# Patient Record
Sex: FEMALE | Race: WHITE | NOT HISPANIC OR LATINO | Employment: OTHER | URBAN - METROPOLITAN AREA
[De-identification: names, ages, dates, MRNs, and addresses within clinical notes are randomized per-mention and may not be internally consistent; named-entity substitution may affect disease eponyms.]

---

## 2019-06-13 ENCOUNTER — HOSPITAL ENCOUNTER (OUTPATIENT)
Dept: OTHER | Facility: HOSPITAL | Age: 84
Discharge: HOME | End: 2019-06-13

## 2019-06-14 ENCOUNTER — HOSPITAL ENCOUNTER (OUTPATIENT)
Dept: OTHER | Facility: HOSPITAL | Age: 84
Discharge: HOME | End: 2019-06-14

## 2019-06-14 LAB
APPEARANCE UR: CLEAR
BILIRUB UR QL: NEGATIVE
COLOR UR: NORMAL
CONV COLLECTION SOURCE (UA): NORMAL
CONV UROBILINOGEN IN URINE BY AUTOMATED TEST STRIP: 0.2 {EHRLICHU}/DL (ref 0.1–1)
GLUCOSE UR QL: NEGATIVE MG/DL
HGB UR QL STRIP: NEGATIVE
KETONES UR QL STRIP: NEGATIVE MG/DL
LEUKOCYTE ESTERASE UR QL STRIP: NEGATIVE
NITRITE UR QL STRIP: NEGATIVE
PH UR STRIP.AUTO: 5.5 [PH] (ref 5–8)
PROT UR QL: NEGATIVE MG/DL
SP GR UR: 1.02 (ref 1–1.03)

## 2019-12-19 ENCOUNTER — HOSPITAL ENCOUNTER (OUTPATIENT)
Dept: OTHER | Facility: HOSPITAL | Age: 84
Discharge: HOME | End: 2019-12-19

## 2022-11-17 ENCOUNTER — LAB REQUISITION (OUTPATIENT)
Dept: LAB | Facility: HOSPITAL | Age: 87
End: 2022-11-17

## 2022-11-17 DIAGNOSIS — N39.0 URINARY TRACT INFECTION, SITE NOT SPECIFIED: ICD-10-CM

## 2022-11-17 LAB
BACTERIA UR QL AUTO: ABNORMAL /HPF
BILIRUB UR QL STRIP: NEGATIVE
CLARITY UR: ABNORMAL
COLOR UR: YELLOW
GLUCOSE UR STRIP-MCNC: NEGATIVE MG/DL
HGB UR QL STRIP.AUTO: NEGATIVE
KETONES UR QL STRIP: NEGATIVE
LEUKOCYTE ESTERASE UR QL STRIP.AUTO: NEGATIVE
NITRITE UR QL STRIP: NEGATIVE
PH UR STRIP.AUTO: 6.5 [PH] (ref 5–8)
PROT UR QL STRIP: NEGATIVE
RBC # UR STRIP: ABNORMAL /HPF
REF LAB TEST METHOD: ABNORMAL
SP GR UR STRIP: 1.02 (ref 1–1.03)
SQUAMOUS #/AREA URNS HPF: ABNORMAL /HPF
UROBILINOGEN UR QL STRIP: ABNORMAL
WBC # UR STRIP: ABNORMAL /HPF

## 2022-11-17 PROCEDURE — 87186 SC STD MICRODIL/AGAR DIL: CPT | Performed by: INTERNAL MEDICINE

## 2022-11-17 PROCEDURE — 87086 URINE CULTURE/COLONY COUNT: CPT | Performed by: INTERNAL MEDICINE

## 2022-11-17 PROCEDURE — 81001 URINALYSIS AUTO W/SCOPE: CPT | Performed by: INTERNAL MEDICINE

## 2022-11-19 LAB — BACTERIA SPEC AEROBE CULT: ABNORMAL

## 2022-12-21 ENCOUNTER — LAB REQUISITION (OUTPATIENT)
Dept: LAB | Facility: HOSPITAL | Age: 87
End: 2022-12-21

## 2022-12-21 DIAGNOSIS — N39.0 URINARY TRACT INFECTION, SITE NOT SPECIFIED: ICD-10-CM

## 2022-12-21 DIAGNOSIS — I10 ESSENTIAL (PRIMARY) HYPERTENSION: ICD-10-CM

## 2022-12-21 LAB
BILIRUB UR QL STRIP: NEGATIVE
CLARITY UR: ABNORMAL
COLOR UR: YELLOW
GLUCOSE UR STRIP-MCNC: NEGATIVE MG/DL
HGB UR QL STRIP.AUTO: NEGATIVE
KETONES UR QL STRIP: NEGATIVE
LEUKOCYTE ESTERASE UR QL STRIP.AUTO: NEGATIVE
NITRITE UR QL STRIP: NEGATIVE
PH UR STRIP.AUTO: 7 [PH] (ref 5–8)
PROT UR QL STRIP: NEGATIVE
SP GR UR STRIP: 1.02 (ref 1–1.03)
UROBILINOGEN UR QL STRIP: ABNORMAL

## 2022-12-21 PROCEDURE — 81003 URINALYSIS AUTO W/O SCOPE: CPT | Performed by: INTERNAL MEDICINE

## 2022-12-21 PROCEDURE — 87086 URINE CULTURE/COLONY COUNT: CPT | Performed by: INTERNAL MEDICINE

## 2022-12-23 LAB — BACTERIA SPEC AEROBE CULT: NORMAL

## 2023-01-01 ENCOUNTER — TELEPHONE (OUTPATIENT)
Dept: FAMILY MEDICINE CLINIC | Age: 88
End: 2023-01-01

## 2023-02-13 ENCOUNTER — LAB (OUTPATIENT)
Dept: LAB | Facility: HOSPITAL | Age: 88
End: 2023-02-13
Payer: MEDICARE

## 2023-02-13 ENCOUNTER — OFFICE VISIT (OUTPATIENT)
Dept: FAMILY MEDICINE CLINIC | Age: 88
End: 2023-02-13
Payer: MEDICARE

## 2023-02-13 VITALS
TEMPERATURE: 97.9 F | OXYGEN SATURATION: 92 % | SYSTOLIC BLOOD PRESSURE: 99 MMHG | HEIGHT: 64 IN | HEART RATE: 108 BPM | WEIGHT: 155 LBS | BODY MASS INDEX: 26.46 KG/M2 | DIASTOLIC BLOOD PRESSURE: 69 MMHG

## 2023-02-13 DIAGNOSIS — N39.0 RECURRENT UTI: ICD-10-CM

## 2023-02-13 DIAGNOSIS — E03.9 ACQUIRED HYPOTHYROIDISM: ICD-10-CM

## 2023-02-13 DIAGNOSIS — Z79.899 HIGH RISK MEDICATION USE: ICD-10-CM

## 2023-02-13 DIAGNOSIS — G25.81 RESTLESS LEG SYNDROME: ICD-10-CM

## 2023-02-13 DIAGNOSIS — R00.0 TACHYCARDIA: Primary | ICD-10-CM

## 2023-02-13 DIAGNOSIS — M47.816 LUMBAR SPONDYLOSIS: ICD-10-CM

## 2023-02-13 PROBLEM — M46.1 INFLAMMATION OF SACROILIAC JOINT: Status: ACTIVE | Noted: 2023-02-13

## 2023-02-13 PROBLEM — K21.9 GERD (GASTROESOPHAGEAL REFLUX DISEASE): Status: ACTIVE | Noted: 2023-02-13

## 2023-02-13 PROBLEM — S42.293P: Status: ACTIVE | Noted: 2023-01-04

## 2023-02-13 LAB
ALBUMIN SERPL-MCNC: 4.1 G/DL (ref 3.5–5.2)
ALBUMIN/GLOB SERPL: 1.2 G/DL
ALP SERPL-CCNC: 142 U/L (ref 39–117)
ALT SERPL W P-5'-P-CCNC: 8 U/L (ref 1–33)
AMPHET+METHAMPHET UR QL: NEGATIVE
AMPHETAMINES UR QL: NEGATIVE
ANION GAP SERPL CALCULATED.3IONS-SCNC: 7.3 MMOL/L (ref 5–15)
AST SERPL-CCNC: 25 U/L (ref 1–32)
BARBITURATES UR QL SCN: NEGATIVE
BENZODIAZ UR QL SCN: NEGATIVE
BILIRUB BLD-MCNC: NEGATIVE MG/DL
BILIRUB SERPL-MCNC: 0.3 MG/DL (ref 0–1.2)
BUN SERPL-MCNC: 23 MG/DL (ref 8–23)
BUN/CREAT SERPL: 22.1 (ref 7–25)
BUPRENORPHINE SERPL-MCNC: NEGATIVE NG/ML
CALCIUM SPEC-SCNC: 9.8 MG/DL (ref 8.2–9.6)
CANNABINOIDS SERPL QL: NEGATIVE
CHLORIDE SERPL-SCNC: 100 MMOL/L (ref 98–107)
CLARITY, POC: ABNORMAL
CO2 SERPL-SCNC: 32.7 MMOL/L (ref 22–29)
COCAINE UR QL: NEGATIVE
COLOR UR: ABNORMAL
CREAT SERPL-MCNC: 1.04 MG/DL (ref 0.57–1)
EGFRCR SERPLBLD CKD-EPI 2021: 51.2 ML/MIN/1.73
EXPIRATION DATE: ABNORMAL
EXPIRATION DATE: NORMAL
GLOBULIN UR ELPH-MCNC: 3.4 GM/DL
GLUCOSE SERPL-MCNC: 106 MG/DL (ref 65–99)
GLUCOSE UR STRIP-MCNC: NEGATIVE MG/DL
KETONES UR QL: ABNORMAL
LEUKOCYTE EST, POC: ABNORMAL
Lab: ABNORMAL
Lab: NORMAL
MDMA UR QL SCN: NEGATIVE
METHADONE UR QL SCN: NEGATIVE
NITRITE UR-MCNC: NEGATIVE MG/ML
OPIATES UR QL: NEGATIVE
OXYCODONE UR QL SCN: NEGATIVE
PCP UR QL SCN: NEGATIVE
PH UR: 6 [PH] (ref 5–8)
POTASSIUM SERPL-SCNC: 4.2 MMOL/L (ref 3.5–5.2)
PROT SERPL-MCNC: 7.5 G/DL (ref 6–8.5)
PROT UR STRIP-MCNC: ABNORMAL MG/DL
RBC # UR STRIP: NEGATIVE /UL
SODIUM SERPL-SCNC: 140 MMOL/L (ref 136–145)
SP GR UR: 1.02 (ref 1–1.03)
TSH SERPL DL<=0.05 MIU/L-ACNC: 1.71 UIU/ML (ref 0.27–4.2)
UROBILINOGEN UR QL: ABNORMAL

## 2023-02-13 PROCEDURE — 84443 ASSAY THYROID STIM HORMONE: CPT

## 2023-02-13 PROCEDURE — 36415 COLL VENOUS BLD VENIPUNCTURE: CPT

## 2023-02-13 PROCEDURE — 87086 URINE CULTURE/COLONY COUNT: CPT | Performed by: NURSE PRACTITIONER

## 2023-02-13 PROCEDURE — 87186 SC STD MICRODIL/AGAR DIL: CPT | Performed by: NURSE PRACTITIONER

## 2023-02-13 PROCEDURE — 87077 CULTURE AEROBIC IDENTIFY: CPT | Performed by: NURSE PRACTITIONER

## 2023-02-13 PROCEDURE — 80305 DRUG TEST PRSMV DIR OPT OBS: CPT | Performed by: NURSE PRACTITIONER

## 2023-02-13 PROCEDURE — 80053 COMPREHEN METABOLIC PANEL: CPT

## 2023-02-13 PROCEDURE — 99204 OFFICE O/P NEW MOD 45 MIN: CPT | Performed by: NURSE PRACTITIONER

## 2023-02-13 RX ORDER — METOPROLOL SUCCINATE 25 MG/1
25 TABLET, EXTENDED RELEASE ORAL DAILY
Qty: 90 TABLET | Refills: 0 | Status: SHIPPED | OUTPATIENT
Start: 2023-02-13 | End: 2023-03-08

## 2023-02-13 RX ORDER — MELOXICAM 7.5 MG/1
7.5 TABLET ORAL AS NEEDED
COMMUNITY
End: 2023-02-13

## 2023-02-13 RX ORDER — GABAPENTIN 300 MG/1
300 CAPSULE ORAL 3 TIMES DAILY
Qty: 270 CAPSULE | Refills: 0 | Status: SHIPPED | OUTPATIENT
Start: 2023-02-13

## 2023-02-13 RX ORDER — TRAMADOL HYDROCHLORIDE 50 MG/1
50 TABLET ORAL AS NEEDED
COMMUNITY
Start: 2022-11-16 | End: 2023-03-08

## 2023-02-13 RX ORDER — GABAPENTIN 300 MG/1
300 CAPSULE ORAL 3 TIMES DAILY
COMMUNITY
Start: 2022-10-31 | End: 2023-02-13 | Stop reason: SDUPTHER

## 2023-02-13 RX ORDER — CRANBERRY FRUIT EXTRACT 250 MG
250 CAPSULE ORAL DAILY
COMMUNITY

## 2023-02-13 RX ORDER — LEVOTHYROXINE SODIUM 0.03 MG/1
12.5 TABLET ORAL EVERY MORNING
COMMUNITY
Start: 2022-12-08 | End: 2023-02-13 | Stop reason: SDUPTHER

## 2023-02-13 RX ORDER — CYCLOBENZAPRINE HCL 10 MG
5-10 TABLET ORAL NIGHTLY
COMMUNITY
Start: 2023-02-01

## 2023-02-13 RX ORDER — CHLORHEXIDINE GLUCONATE 4 %
LIQUID (ML) TOPICAL NIGHTLY
COMMUNITY
End: 2023-03-08

## 2023-02-13 RX ORDER — LEVOTHYROXINE SODIUM 0.03 MG/1
12.5 TABLET ORAL DAILY
Qty: 45 TABLET | Refills: 1 | Status: SHIPPED | OUTPATIENT
Start: 2023-02-13

## 2023-02-13 RX ORDER — DULOXETIN HYDROCHLORIDE 20 MG/1
1 CAPSULE, DELAYED RELEASE ORAL DAILY
COMMUNITY
Start: 2023-01-27

## 2023-02-13 RX ORDER — LATANOPROST 50 UG/ML
SOLUTION/ DROPS OPHTHALMIC 2 TIMES DAILY
COMMUNITY
Start: 2023-01-27

## 2023-02-13 RX ORDER — METOPROLOL SUCCINATE 25 MG/1
25 TABLET, EXTENDED RELEASE ORAL DAILY
COMMUNITY
End: 2023-02-13 | Stop reason: SDUPTHER

## 2023-02-13 RX ORDER — ANTIARTHRITIC COMBINATION NO.2 900 MG
TABLET ORAL DAILY
COMMUNITY

## 2023-02-13 RX ORDER — HYDROCODONE BITARTRATE AND ACETAMINOPHEN 5; 325 MG/1; MG/1
TABLET ORAL AS NEEDED
COMMUNITY
Start: 2022-11-05 | End: 2023-03-08

## 2023-02-13 RX ORDER — DORZOLAMIDE HCL 20 MG/ML
SOLUTION/ DROPS OPHTHALMIC
COMMUNITY
Start: 2022-08-24

## 2023-02-13 RX ORDER — UBIDECARENONE 75 MG
50 CAPSULE ORAL DAILY
COMMUNITY
End: 2023-03-08

## 2023-02-13 NOTE — PROGRESS NOTES
"Chief Complaint  Rae Smith presents to [facility] for Establish Care    Subjective          History of Present Illness    Rae is here today to establish care. She is a former patient of Dr Alicea who retired.   She is also seeing ortho Dr Rob after humerus fracture 11/2022. She has [facility] coming to do physical therapy.   She sees [facility] pain management in [facility] for lumbar spondylosis. She is prescribed Hydrocodone. She was given tramadol at the hospital. She reports that she mostly takes acetaminophen now.   She is on Toprol XL for elevated heart rate. Saw cardiologist 'a long time ago'. She was on 50 mg daily and this was reduced to 25 mg daily..   She was diagnosed with hypothyroidism by Dr Alicea. She takes levothyroxine 12.5 mcg daily. Last TSH was normal in 2020 on records that she has with her today.  She reports that she is on sinemet and gabapentin for restless leg syndrome. She has been stable on these medications for 'several years'.  She reports that she was put on duloxetine because her 'flesh was hurting'.   She is no longer taking pravastatin for hyperlipidemia. Reports that she was advised to stop as her cholesterol was 'perfect'.   She reports that she gets frequent UTIs. No current urine symptoms.  She declines bone density scan.   She sees Dr Mitchell optometry. On eye drops for glaucoma.   She sees Vilma Art for dermatology. History of melanoma.     Review of Systems       Allergies   Allergen Reactions   • Codeine Nausea Only     insomnia  insomnia     • Morphine Nausea And Vomiting     \"it makes me deathly sick\"  \"it makes me deathly sick\"        Past Medical History:   Diagnosis Date   • Cataract    • GERD (gastroesophageal reflux disease)    • Low back pain    • Osteopenia    • Pneumonia    • Urinary tract infection    • Visual impairment      Current Outpatient Medications   Medication Sig Dispense Refill   • ACETAMINOPHEN PO Take 500 mg by " mouth As Needed.     • Biotin 5000 MCG tablet Take  by mouth Daily.     • carbidopa-levodopa (SINEMET)  MG per tablet Take 1 tablet by mouth 3 (Three) Times a Day.     • Cholecalciferol (VITAMIN D3 PO) Take 50 mcg by mouth Daily.     • Cranberry 250 MG capsule Take 250 mg by mouth Daily.     • cyclobenzaprine (FLEXERIL) 10 MG tablet Take 5-10 mg by mouth Every Night.     • Diclofenac Sodium (VOLTAREN) 1 % gel gel As Needed.     • dorzolamide (TRUSOPT) 2 % ophthalmic solution dorzolamide 2 % eye drops   INSTILL 1 DROP EACH EYE EVERY 12 HOURS     • DULoxetine (CYMBALTA) 20 MG capsule Take 1 capsule by mouth Daily.     • gabapentin (NEURONTIN) 300 MG capsule Take 1 capsule by mouth 3 (Three) Times a Day. 270 capsule 0   • HYDROcodone-acetaminophen (NORCO) 5-325 MG per tablet As Needed.     • latanoprost (XALATAN) 0.005 % ophthalmic solution 2 (Two) Times a Day.     • levothyroxine (SYNTHROID, LEVOTHROID) 25 MCG tablet Take 0.5 tablets by mouth Daily. 45 tablet 1   • Melatonin 12 MG tablet Take  by mouth Every Night.     • metoprolol succinate XL (TOPROL-XL) 25 MG 24 hr tablet Take 1 tablet by mouth Daily. 1/2 Tablet 90 tablet 0   • traMADol (ULTRAM) 50 MG tablet Take 50 mg by mouth As Needed.     • vitamin B-12 (CYANOCOBALAMIN) 100 MCG tablet Take 50 mcg by mouth Daily.       No current facility-administered medications for this visit.     Past Surgical History:   Procedure Laterality Date   • CHOLECYSTECTOMY     • COLONOSCOPY     • EYE SURGERY     • FRACTURE SURGERY     • HYSTERECTOMY     • JOINT REPLACEMENT        Social History     Tobacco Use   • Smoking status: Never     Passive exposure: Never   • Smokeless tobacco: Never   Vaping Use   • Vaping Use: Never used   Substance Use Topics   • Alcohol use: Never   • Drug use: Never     Family History   Problem Relation Age of Onset   • Diabetes Daughter    • Diabetes Son    • Cancer Son      Health Maintenance Due   Topic Date Due   • ANNUAL WELLNESS VISIT  Never  "done      Immunization History   Administered Date(s) Administered   • COVID-19 (MODERNA) BOOSTER 10/11/2022   • COVID-19 (PFIZER) PURPLE CAP 02/04/2021, 02/27/2021, 08/17/2021   • Fluad Quad 65+ 10/11/2022   • Fluzone High-Dose 65+yrs 10/14/2021   • Hep B, Unspecified 01/29/1996   • Hepatitis B 07/02/1996   • Shingrix 11/04/2021        Objective     Vitals:    02/13/23 1056   BP: 99/69   BP Location: Right arm   Patient Position: Sitting   Pulse: 108   Temp: 97.9 °F (36.6 °C)   TempSrc: Oral   SpO2: 92%   Weight: 70.3 kg (155 lb)   Height: 162.6 cm (64\")     Body mass index is 26.61 kg/m².     Physical Exam  Vitals reviewed.   Constitutional:       General: She is not in acute distress.     Appearance: Normal appearance. She is well-developed.   HENT:      Head: Normocephalic and atraumatic.   Cardiovascular:      Rate and Rhythm: Normal rate and regular rhythm.   Pulmonary:      Effort: Pulmonary effort is normal.      Breath sounds: Normal breath sounds.   Musculoskeletal:      Comments: In wheelchair   Neurological:      Mental Status: She is alert and oriented to person, place, and time.   Psychiatric:         Mood and Affect: Mood and affect normal.           Result Review :                               Assessment and Plan      Diagnoses and all orders for this visit:    1. Tachycardia (Primary)  Comments:  Medical condition is stable.  Continue same therapy.  Will recheck at next regular appointment  Orders:  -     metoprolol succinate XL (TOPROL-XL) 25 MG 24 hr tablet; Take 1 tablet by mouth Daily. 1/2 Tablet  Dispense: 90 tablet; Refill: 0    2. Acquired hypothyroidism  Comments:  Medical condition is stable.  Continue same therapy. Rechecking lab and will adjust medication as needed  Orders:  -     levothyroxine (SYNTHROID, LEVOTHROID) 25 MCG tablet; Take 0.5 tablets by mouth Daily.  Dispense: 45 tablet; Refill: 1  -     TSH; Future  -     Comprehensive metabolic panel; Future    3. Recurrent " UTI  Comments:  Requesting to have urine checked. Asymptomatic.   Orders:  -     POCT urinalysis dipstick, automated  -     Urine Culture - Urine, Urine, Clean Catch; Future  -     Urine Culture - Urine, Urine, Clean Catch    4. Lumbar spondylosis  Comments:  Continue f/u with pain management as per their recommendations    5. Restless leg syndrome  Comments:  Medical condition is stable.  Continue same therapy.  Will recheck at next regular appointment  Orders:  -     gabapentin (NEURONTIN) 300 MG capsule; Take 1 capsule by mouth 3 (Three) Times a Day.  Dispense: 270 capsule; Refill: 0    6. High risk medication use  -     POC Urine Drug Screen [facility]      Controlled substance documentation: VERONICA reviewed; drug screen performed and appropriate; consent is reviewed and signed and on the chart.  Is aware of risk of addiction on this medication, understands will need to follow up for a review at least every 3 months and medications will be adjusted or decreased as deemed appropriate at each visit.  No history of drug or alcohol abuse.  No concerns about diversion or abuse. Denies side effects related to the medication.  Aware may be called in for pill counts.  The dosing of this medication will be reviewed on a regular basis and reduced if possible.             Follow Up     Return in about 3 months (around 5/13/2023) for Recheck.

## 2023-02-16 PROBLEM — G25.81 RESTLESS LEG SYNDROME: Status: ACTIVE | Noted: 2023-02-16

## 2023-02-16 LAB — BACTERIA SPEC AEROBE CULT: ABNORMAL

## 2023-02-22 ENCOUNTER — OFFICE VISIT (OUTPATIENT)
Dept: FAMILY MEDICINE CLINIC | Age: 88
End: 2023-02-22
Payer: MEDICARE

## 2023-02-22 VITALS
SYSTOLIC BLOOD PRESSURE: 107 MMHG | TEMPERATURE: 97.3 F | HEART RATE: 99 BPM | OXYGEN SATURATION: 99 % | BODY MASS INDEX: 26.29 KG/M2 | DIASTOLIC BLOOD PRESSURE: 70 MMHG | HEIGHT: 64 IN | WEIGHT: 154 LBS

## 2023-02-22 DIAGNOSIS — N39.0 URINARY TRACT INFECTION WITHOUT HEMATURIA, SITE UNSPECIFIED: Primary | ICD-10-CM

## 2023-02-22 DIAGNOSIS — R42 DIZZINESS: ICD-10-CM

## 2023-02-22 PROCEDURE — 99214 OFFICE O/P EST MOD 30 MIN: CPT | Performed by: NURSE PRACTITIONER

## 2023-02-22 RX ORDER — MECLIZINE HCL 12.5 MG/1
12.5 TABLET ORAL 2 TIMES DAILY PRN
Qty: 30 TABLET | Refills: 0 | Status: SHIPPED | OUTPATIENT
Start: 2023-02-22 | End: 2023-03-03

## 2023-02-22 RX ORDER — NITROFURANTOIN 25; 75 MG/1; MG/1
100 CAPSULE ORAL 2 TIMES DAILY
Qty: 14 CAPSULE | Refills: 0 | Status: SHIPPED | OUTPATIENT
Start: 2023-02-22 | End: 2023-03-01

## 2023-02-22 NOTE — PROGRESS NOTES
"Chief Complaint  Rae Smith presents to [facility] for Dizziness (Dizziness X 1 week )    Subjective          History of Present Illness    Rae is here today with c/o dizziness. She reports that she has been having these dizzy episodes intermittently for \"awhile\". They thought that it might be related to her metoprolol originally but her dose was decreased. She notes dizziness when going from sitting to standing position. Improvement with sitting. She denies ear symptoms. She had recent lab work indicating some dehydration and there was bacteria (E.coli) in urine. Had not reported any symptoms at that time so was not treated. Notes improvement in gait with using walker.     Review of Systems      Allergies   Allergen Reactions   • Codeine Nausea Only     insomnia  insomnia     • Morphine Nausea And Vomiting     \"it makes me deathly sick\"  \"it makes me deathly sick\"        Past Medical History:   Diagnosis Date   • Cataract    • GERD (gastroesophageal reflux disease)    • Glaucoma    • Low back pain    • Osteopenia    • Pneumonia    • Urinary tract infection    • Visual impairment      Current Outpatient Medications   Medication Sig Dispense Refill   • ACETAMINOPHEN PO Take 500 mg by mouth As Needed.     • Biotin 5000 MCG tablet Take  by mouth Daily.     • carbidopa-levodopa (SINEMET)  MG per tablet Take 1 tablet by mouth 3 (Three) Times a Day.     • Cholecalciferol (VITAMIN D3 PO) Take 50 mcg by mouth Daily.     • Cranberry 250 MG capsule Take 250 mg by mouth Daily.     • cyclobenzaprine (FLEXERIL) 10 MG tablet Take 5-10 mg by mouth Every Night.     • Diclofenac Sodium (VOLTAREN) 1 % gel gel As Needed.     • dorzolamide (TRUSOPT) 2 % ophthalmic solution dorzolamide 2 % eye drops   INSTILL 1 DROP EACH EYE EVERY 12 HOURS     • DULoxetine (CYMBALTA) 20 MG capsule Take 1 capsule by mouth Daily.     • gabapentin (NEURONTIN) 300 MG capsule Take 1 capsule by mouth 3 (Three) " Times a Day. 270 capsule 0   • HYDROcodone-acetaminophen (NORCO) 5-325 MG per tablet As Needed.     • latanoprost (XALATAN) 0.005 % ophthalmic solution 2 (Two) Times a Day.     • levothyroxine (SYNTHROID, LEVOTHROID) 25 MCG tablet Take 0.5 tablets by mouth Daily. 45 tablet 1   • Melatonin 12 MG tablet Take  by mouth Every Night.     • metoprolol succinate XL (TOPROL-XL) 25 MG 24 hr tablet Take 1 tablet by mouth Daily. 1/2 Tablet (Patient taking differently: Take 25 mg by mouth Daily.) 90 tablet 0   • traMADol (ULTRAM) 50 MG tablet Take 50 mg by mouth As Needed.     • vitamin B-12 (CYANOCOBALAMIN) 100 MCG tablet Take 50 mcg by mouth Daily.     • meclizine (ANTIVERT) 12.5 MG tablet Take 1 tablet by mouth 2 (Two) Times a Day As Needed for Dizziness. 30 tablet 0   • nitrofurantoin, macrocrystal-monohydrate, (Macrobid) 100 MG capsule Take 1 capsule by mouth 2 (Two) Times a Day for 7 days. 14 capsule 0     No current facility-administered medications for this visit.     Past Surgical History:   Procedure Laterality Date   • CHOLECYSTECTOMY     • COLONOSCOPY     • EYE SURGERY     • FRACTURE SURGERY     • HYSTERECTOMY     • JOINT REPLACEMENT        Social History     Tobacco Use   • Smoking status: Never     Passive exposure: Never   • Smokeless tobacco: Never   Vaping Use   • Vaping Use: Never used   Substance Use Topics   • Alcohol use: Never   • Drug use: Never     Family History   Problem Relation Age of Onset   • Diabetes Daughter    • Diabetes Son    • Cancer Son    • Diabetes Son    • Diabetes Son    • Diabetes Daughter    • Early death Son      Health Maintenance Due   Topic Date Due   • DXA SCAN  Never done   • ZOSTER VACCINE (2 of 2) 12/30/2021   • ANNUAL WELLNESS VISIT  Never done      Immunization History   Administered Date(s) Administered   • COVID-19 (MODERNA) BOOSTER 10/11/2022   • COVID-19 (PFIZER) PURPLE CAP 02/04/2021, 02/27/2021, 08/17/2021   • Fluad Quad 65+ 10/11/2022   • Fluzone High-Dose 65+yrs  "10/14/2021   • Hep B, Unspecified 01/29/1996   • Hepatitis B 07/02/1996   • Shingrix 11/04/2021        Objective     Vitals:    02/22/23 0953   BP: 107/70   BP Location: Left arm   Patient Position: Sitting   Pulse: 99   Temp: 97.3 °F (36.3 °C)   TempSrc: Oral   SpO2: 99%   Weight: 69.9 kg (154 lb)   Height: 162.6 cm (64\")     Body mass index is 26.43 kg/m².     Physical Exam  Vitals reviewed.   Constitutional:       General: She is not in acute distress.     Appearance: Normal appearance. She is well-developed.   HENT:      Head: Normocephalic and atraumatic.      Right Ear: Tympanic membrane and ear canal normal.      Left Ear: Tympanic membrane and ear canal normal.   Eyes:      Pupils: Pupils are equal, round, and reactive to light.   Cardiovascular:      Rate and Rhythm: Normal rate and regular rhythm.   Pulmonary:      Effort: Pulmonary effort is normal.      Breath sounds: Normal breath sounds.   Neurological:      Mental Status: She is alert and oriented to person, place, and time.   Psychiatric:         Mood and Affect: Mood and affect normal.           Result Review :                               Assessment and Plan      Diagnoses and all orders for this visit:    1. Urinary tract infection without hematuria, site unspecified (Primary)  -     nitrofurantoin, macrocrystal-monohydrate, (Macrobid) 100 MG capsule; Take 1 capsule by mouth 2 (Two) Times a Day for 7 days.  Dispense: 14 capsule; Refill: 0    2. Dizziness  -     meclizine (ANTIVERT) 12.5 MG tablet; Take 1 tablet by mouth 2 (Two) Times a Day As Needed for Dizziness.  Dispense: 30 tablet; Refill: 0      Will treat for UTI with antibiotics. Advised increased water intake as UTI and dehydration likely contributing to dizziness. Should be seen again if persistent or worsening symptoms. May need additional testing. Advised to change positions slowly.         Follow Up     Return in about 4 weeks (around 3/22/2023) for Recheck. Plan for urine recheck at " that time.

## 2023-03-03 DIAGNOSIS — R42 DIZZINESS: ICD-10-CM

## 2023-03-03 RX ORDER — MECLIZINE HCL 12.5 MG/1
TABLET ORAL
Qty: 30 TABLET | Refills: 0 | Status: SHIPPED | OUTPATIENT
Start: 2023-03-03 | End: 2023-03-08 | Stop reason: SDUPTHER

## 2023-03-06 ENCOUNTER — READMISSION MANAGEMENT (OUTPATIENT)
Dept: CALL CENTER | Facility: HOSPITAL | Age: 88
End: 2023-03-06
Payer: MEDICARE

## 2023-03-06 NOTE — TELEPHONE ENCOUNTER
Caller: Rae Smith    Relationship to patient: Self    Best call back number: [phone]    New or established patient?  [] New  [x] Established    Date of discharge: 3/5/23    Facility discharged from: [facility]    Diagnosis/Symptoms: DIZZINESS    PATIENT HAS A HOSPITAL FOLLOW UP SCHEDULED FOR 3/8/23.

## 2023-03-06 NOTE — OUTREACH NOTE
Prep Survey    Flowsheet Row Responses   [facility] facility patient discharged from? Non-BH   Is LACE score < 7 ? Non-BH Discharge   Eligibility [facility]   Hospital [facility]   Date of Discharge 03/05/23   Discharge diagnosis dizziness   Does the patient have one of the following disease processes/diagnoses(primary or secondary)? Other   Prep survey completed? Yes          Jane Meyer Registered Nurse

## 2023-03-07 ENCOUNTER — TRANSITIONAL CARE MANAGEMENT TELEPHONE ENCOUNTER (OUTPATIENT)
Dept: CALL CENTER | Facility: HOSPITAL | Age: 88
End: 2023-03-07
Payer: MEDICARE

## 2023-03-07 NOTE — OUTREACH NOTE
Call Center TCM Note    Flowsheet Row Responses   [facility] patient discharged from? Non-  [[facility]]   Does the patient have one of the following disease processes/diagnoses(primary or secondary)? Other   TCM attempt successful? Yes   Call start time 1704   Call end time 1709   Discharge diagnosis dizziness, falls   Person spoke with today (if not patient) and relationship Patient   Meds reviewed with patient/caregiver? Yes  [Patient reports that she has two new meds ordered and she will bring papers to office tomorrow. ]   Does the patient have all medications ordered at discharge? Yes   Is the patient taking all medications as directed (includes completed medication regime)? Yes   [facility] [place]8/2023 10:15 AM   Does the patient have an appointment with their PCP within 7 days of discharge? Yes   What is the Home health agency?  Denies need for HH   Has home health visited the patient within 72 hours of discharge? N/A   Psychosocial issues? No   Did the patient receive a copy of their discharge instructions? Yes   Nursing interventions --  [Will bring discharge papers to PCP appt tomorrow. ]   What is the patient's perception of their health status since discharge? Improving  [Denies any dizziness today. ]   Is the patient/caregiver able to teach back signs and symptoms related to disease process for when to call PCP? Yes   Is the patient/caregiver able to teach back signs and symptoms related to disease process for when to call 911? Yes   Is the patient/caregiver able to teach back the hierarchy of who to call/visit for symptoms/problems? PCP, Specialist, Home health nurse, Urgent Care, ED, 911 Yes   If the patient is a current smoker, are they able to teach back resources for cessation? Not a smoker   TCM call completed? Yes   Wrap up additional comments Patient denies any needs before f/u appt tomorrow.    Call end time 1709   Would this patient benefit  from a Referral to [facility] Social Work? No   Is the patient interested in additional calls from an ambulatory ?  NOTE:  applies to high risk patients requiring additional follow-up. No          Rae Cates RN    3/7/2023, 17:10 EST

## 2023-03-08 ENCOUNTER — OFFICE VISIT (OUTPATIENT)
Dept: FAMILY MEDICINE CLINIC | Age: 88
End: 2023-03-08
Payer: MEDICARE

## 2023-03-08 VITALS
SYSTOLIC BLOOD PRESSURE: 90 MMHG | HEART RATE: 118 BPM | WEIGHT: 156 LBS | OXYGEN SATURATION: 100 % | BODY MASS INDEX: 26.63 KG/M2 | DIASTOLIC BLOOD PRESSURE: 54 MMHG | HEIGHT: 64 IN | TEMPERATURE: 97.5 F

## 2023-03-08 DIAGNOSIS — R00.0 TACHYCARDIA: ICD-10-CM

## 2023-03-08 DIAGNOSIS — I95.1 ORTHOSTATIC SYNCOPE: Primary | ICD-10-CM

## 2023-03-08 DIAGNOSIS — R42 DIZZINESS: ICD-10-CM

## 2023-03-08 PROCEDURE — 1111F DSCHRG MED/CURRENT MED MERGE: CPT | Performed by: NURSE PRACTITIONER

## 2023-03-08 PROCEDURE — 99495 TRANSJ CARE MGMT MOD F2F 14D: CPT | Performed by: NURSE PRACTITIONER

## 2023-03-08 RX ORDER — LANOLIN ALCOHOL/MO/W.PET/CERES
12 CREAM (GRAM) TOPICAL DAILY
COMMUNITY

## 2023-03-08 RX ORDER — MECLIZINE HCL 12.5 MG/1
12.5 TABLET ORAL 2 TIMES DAILY PRN
Qty: 30 TABLET | Refills: 0 | Status: SHIPPED | OUTPATIENT
Start: 2023-03-08

## 2023-03-08 RX ORDER — MELOXICAM 7.5 MG/1
7.5 TABLET ORAL DAILY
COMMUNITY

## 2023-03-08 RX ORDER — FOLIC ACID 1 MG/1
1 TABLET ORAL DAILY
Qty: 30 TABLET | Refills: 11 | COMMUNITY
Start: 2023-03-06 | End: 2023-03-08

## 2023-03-08 RX ORDER — FOLIC ACID 1 MG/1
TABLET ORAL
Qty: 60 TABLET | Refills: 1 | Status: SHIPPED | OUTPATIENT
Start: 2023-03-08

## 2023-03-08 RX ORDER — LIDOCAINE 50 MG/G
1 PATCH TOPICAL EVERY 24 HOURS
COMMUNITY
Start: 2023-03-05

## 2023-03-08 RX ORDER — FLUDROCORTISONE ACETATE 0.1 MG/1
0.1 TABLET ORAL DAILY
COMMUNITY
Start: 2023-03-06

## 2023-03-08 NOTE — PROGRESS NOTES
"Chief Complaint  Rae Smith presents to [facility] for Hospital Follow Up Visit ([facility] for a fall)    Subjective          History of Present Illness    Rae is here today to follow up after recent hospitalization at [facility] for orthostatic hypotension and fall. Her metoprolol was stopped. She was started on florinef and folic acid. She notes dizziness has improved since being released from the hospital. She was given compression stockings to wear. Also advised to see cardiology and neurology.   She has been using a cream for her neck pain. She notes that this helps but unsure of name.  She declines physical therapy at this time.      Review of Systems   Constitutional: Negative for chills and fever.   HENT: Negative for ear pain and sore throat.    Eyes: Negative for blurred vision and redness.   Respiratory: Negative for shortness of breath and wheezing.    Cardiovascular: Negative for chest pain and palpitations.   Gastrointestinal: Negative for abdominal pain and vomiting.   Genitourinary: Negative for breast pain and vaginal discharge.   Skin: Negative for rash.   Neurological: Negative for dizziness and seizures.   Psychiatric/Behavioral: Negative for suicidal ideas and depressed mood.         Allergies   Allergen Reactions   • Codeine Nausea Only     insomnia  insomnia     • Morphine Nausea And Vomiting     \"it makes me deathly sick\"  \"it makes me deathly sick\"        Past Medical History:   Diagnosis Date   • Cataract    • GERD (gastroesophageal reflux disease)    • Glaucoma    • Low back pain    • Osteopenia    • Pneumonia    • Urinary tract infection    • Visual impairment      Current Outpatient Medications   Medication Sig Dispense Refill   • ACETAMINOPHEN PO Take 500 mg by mouth As Needed.     • Biotin 5000 MCG tablet Take  by mouth Daily.     • carbidopa-levodopa (SINEMET)  MG per tablet Take 1 tablet by mouth 3 (Three) Times a Day.     • Cholecalciferol " (VITAMIN D3 PO) Take 50 mcg by mouth Daily.     • Cranberry 250 MG capsule Take 250 mg by mouth Daily.     • cyclobenzaprine (FLEXERIL) 10 MG tablet Take 5-10 mg by mouth Every Night.     • Diclofenac Sodium (VOLTAREN) 1 % gel gel As Needed.     • dorzolamide (TRUSOPT) 2 % ophthalmic solution dorzolamide 2 % eye drops   INSTILL 1 DROP EACH EYE EVERY 12 HOURS     • DULoxetine (CYMBALTA) 20 MG capsule Take 1 capsule by mouth Daily.     • fludrocortisone 0.1 MG tablet Take 1 tablet by mouth Daily.     • gabapentin (NEURONTIN) 300 MG capsule Take 1 capsule by mouth 3 (Three) Times a Day. 270 capsule 0   • latanoprost (XALATAN) 0.005 % ophthalmic solution 2 (Two) Times a Day.     • levothyroxine (SYNTHROID, LEVOTHROID) 25 MCG tablet Take 0.5 tablets by mouth Daily. 45 tablet 1   • lidocaine (LIDODERM) 5 % Place 1 patch on the skin as directed by provider Daily.     • meclizine (ANTIVERT) 12.5 MG tablet Take 1 tablet by mouth 2 (Two) Times a Day As Needed for Dizziness. 30 tablet 0   • melatonin 3 MG tablet Take 4 tablets by mouth Daily.     • meloxicam (MOBIC) 7.5 MG tablet Take 1 tablet by mouth Daily.     • folic acid (FOLVITE) 1 MG tablet TAKE 1 TABLET BY MOUTH TWICE DAILY 60 tablet 1     No current facility-administered medications for this visit.     Past Surgical History:   Procedure Laterality Date   • CHOLECYSTECTOMY     • COLONOSCOPY     • EYE SURGERY     • FRACTURE SURGERY     • HYSTERECTOMY     • JOINT REPLACEMENT        Social History     Tobacco Use   • Smoking status: Never     Passive exposure: Never   • Smokeless tobacco: Never   Vaping Use   • Vaping Use: Never used   Substance Use Topics   • Alcohol use: Never   • Drug use: Never     Family History   Problem Relation Age of Onset   • Diabetes Daughter    • Diabetes Son    • Cancer Son    • Diabetes Son    • Diabetes Son    • Diabetes Daughter    • Early death Son      Health Maintenance Due   Topic Date Due   • ANNUAL WELLNESS VISIT  Never done     "  Immunization History   Administered Date(s) Administered   • COVID-19 (MODERNA) BOOSTER 10/11/2022   • COVID-19 (PFIZER) PURPLE CAP 02/04/2021, 02/27/2021, 08/17/2021   • Fluad Quad 65+ 10/11/2022   • Fluzone High-Dose 65+yrs 10/14/2021   • Hep B, Unspecified 01/29/1996   • Hepatitis B 07/02/1996   • Shingrix 11/04/2021        Objective     Vitals:    03/08/23 0953 03/08/23 1002 03/08/23 1137   BP: 107/64 90/54    BP Location: Left arm Left arm    Patient Position: Sitting Standing    Pulse: 120 (!) 132 118   Temp: 97.5 °F (36.4 °C)     TempSrc: Oral     SpO2: 100%     Weight: 70.8 kg (156 lb)     Height: 162.6 cm (64\")       Body mass index is 26.78 kg/m².     Physical Exam  Vitals reviewed.   Constitutional:       General: She is not in acute distress.     Appearance: Normal appearance. She is well-developed.   HENT:      Head: Normocephalic and atraumatic.   Cardiovascular:      Rate and Rhythm: Normal rate and regular rhythm.   Pulmonary:      Effort: Pulmonary effort is normal.      Breath sounds: Normal breath sounds.   Musculoskeletal:      Comments: Using walker   Neurological:      Mental Status: She is alert and oriented to person, place, and time.   Psychiatric:         Mood and Affect: Mood and affect normal.           Result Review :                               Assessment and Plan      Diagnoses and all orders for this visit:    1. Orthostatic syncope (Primary)  -     Ambulatory Referral to Cardiology  -     Ambulatory Referral to Neurology    2. Tachycardia  -     Ambulatory Referral to Cardiology    3. Dizziness  -     Ambulatory Referral to Neurology  -     meclizine (ANTIVERT) 12.5 MG tablet; Take 1 tablet by mouth 2 (Two) Times a Day As Needed for Dizziness.  Dispense: 30 tablet; Refill: 0      Will get her set up with specialty. She will continue new medications. Also encouraged continued use of compression stockings. Change positions slowly. High salt diet.         Follow Up     Return for " Next scheduled follow up.

## 2023-03-09 ENCOUNTER — TELEPHONE (OUTPATIENT)
Dept: FAMILY MEDICINE CLINIC | Age: 88
End: 2023-03-09
Payer: MEDICARE

## 2023-04-17 NOTE — TELEPHONE ENCOUNTER
Caller: LINNEA RASMUSSEN    Relationship: Emergency Contact    Best call back number: [phone]    What is the best time to reach you: ANYTIME     Who are you requesting to speak with (clinical staff, provider,  specific staff member): CLINICAL       What was the call regarding: PATIENT DAUGHTER CALLING REPORTING THAT PATIENT, HAS FALLEN TWICE IN SIX WEEKS AND WANTS TO MAKE THE PCP AWARE.     Do you require a callback: YES

## 2023-05-11 ENCOUNTER — OUTSIDE FACILITY SERVICE (OUTPATIENT)
Dept: FAMILY MEDICINE CLINIC | Age: 88
End: 2023-05-11
Payer: MEDICARE

## 2023-05-15 ENCOUNTER — OFFICE VISIT (OUTPATIENT)
Dept: FAMILY MEDICINE CLINIC | Age: 88
End: 2023-05-15
Payer: MEDICARE

## 2023-05-15 VITALS
HEART RATE: 121 BPM | OXYGEN SATURATION: 98 % | DIASTOLIC BLOOD PRESSURE: 44 MMHG | TEMPERATURE: 97.9 F | HEIGHT: 64 IN | BODY MASS INDEX: 25.54 KG/M2 | SYSTOLIC BLOOD PRESSURE: 74 MMHG | WEIGHT: 149.6 LBS

## 2023-05-15 DIAGNOSIS — M47.816 LUMBAR SPONDYLOSIS: Primary | ICD-10-CM

## 2023-05-15 DIAGNOSIS — E03.9 ACQUIRED HYPOTHYROIDISM: ICD-10-CM

## 2023-05-15 DIAGNOSIS — G25.81 RESTLESS LEG SYNDROME: ICD-10-CM

## 2023-05-15 DIAGNOSIS — I95.1 ORTHOSTATIC SYNCOPE: ICD-10-CM

## 2023-05-15 PROBLEM — S32.82XA MULTIPLE CLOSED PELVIC FRACTURES WITHOUT DISRUPTION OF PELVIC CIRCLE: Status: ACTIVE | Noted: 2023-03-26

## 2023-05-15 PROBLEM — Z85.820 HISTORY OF MELANOMA: Status: ACTIVE | Noted: 2023-05-15

## 2023-05-15 PROCEDURE — 1159F MED LIST DOCD IN RCRD: CPT | Performed by: NURSE PRACTITIONER

## 2023-05-15 PROCEDURE — 1160F RVW MEDS BY RX/DR IN RCRD: CPT | Performed by: NURSE PRACTITIONER

## 2023-05-15 PROCEDURE — 99214 OFFICE O/P EST MOD 30 MIN: CPT | Performed by: NURSE PRACTITIONER

## 2023-05-15 RX ORDER — MIDODRINE HYDROCHLORIDE 10 MG/1
10 TABLET ORAL
Qty: 90 TABLET | Refills: 0 | Status: SHIPPED | OUTPATIENT
Start: 2023-05-15

## 2023-05-15 RX ORDER — MELOXICAM 7.5 MG/1
7.5 TABLET ORAL DAILY
Qty: 90 TABLET | Refills: 0 | Status: SHIPPED | OUTPATIENT
Start: 2023-05-15

## 2023-05-15 NOTE — PROGRESS NOTES
"Chief Complaint  Rae Smith presents to [facility] for Hypothyroidism    Subjective          History of Present Illness    Rae is here today for follow up.   She takes levothyroxine 12.5 mcg daily for hypothyroidism. Last TSH normal.   She is on sinemet and gabapentin for RLS. Has been taking these medications for 'several years'.  She reports that she was put on duloxetine because her 'flesh was hurting'.   She was hospitalized earlier this year for orthostatic syncope and hip fracture. Went to rehab for 5 weeks after being discharged from hospital. Was started on midodrine while in rehab. She has been out for 3 weeks but has not been taking any of her new medications. She has physical therapy coming twice weekly. Family member reports that she is doing better with balance. Now using walker better after working with physical therapy. She has appt with cardiology and neurology scheduled. Pt denies dizziness. She has had her follow up with ortho. Does report some hip discomfort at times. Was discharged on meloxicam but has not been taking for the last 3 weeks.     Review of Systems      Allergies   Allergen Reactions   • Codeine Nausea Only     insomnia  insomnia     • Morphine Nausea And Vomiting     \"it makes me deathly sick\"  \"it makes me deathly sick\"        Past Medical History:   Diagnosis Date   • Cataract    • GERD (gastroesophageal reflux disease)    • Glaucoma    • Low back pain    • Osteopenia    • Pneumonia    • Urinary tract infection    • Visual impairment      Current Outpatient Medications   Medication Sig Dispense Refill   • Biotin 5000 MCG tablet Take  by mouth Daily.     • carbidopa-levodopa (SINEMET)  MG per tablet Take 1 tablet by mouth 3 (Three) Times a Day.     • Cholecalciferol (VITAMIN D3 PO) Take 50 mcg by mouth Daily.     • Cranberry 250 MG capsule Take 250 mg by mouth Daily.     • cyclobenzaprine (FLEXERIL) 10 MG tablet Take 5-10 mg by " mouth Every Night.     • Diclofenac Sodium (VOLTAREN) 1 % gel gel As Needed.     • dorzolamide (TRUSOPT) 2 % ophthalmic solution dorzolamide 2 % eye drops   INSTILL 1 DROP EACH EYE EVERY 12 HOURS     • DULoxetine (CYMBALTA) 20 MG capsule Take 1 capsule by mouth Daily.     • fludrocortisone 0.1 MG tablet Take 1 tablet by mouth Daily.     • folic acid (FOLVITE) 1 MG tablet TAKE 1 TABLET BY MOUTH TWICE DAILY 60 tablet 1   • gabapentin (NEURONTIN) 300 MG capsule Take 1 capsule by mouth 3 (Three) Times a Day. 270 capsule 0   • latanoprost (XALATAN) 0.005 % ophthalmic solution 2 (Two) Times a Day.     • levothyroxine (SYNTHROID, LEVOTHROID) 25 MCG tablet Take 0.5 tablets by mouth Daily. 45 tablet 1   • lidocaine (LIDODERM) 5 % Place 1 patch on the skin as directed by provider Daily.     • meclizine (ANTIVERT) 12.5 MG tablet Take 1 tablet by mouth 2 (Two) Times a Day As Needed for Dizziness. 30 tablet 0   • melatonin 3 MG tablet Take 4 tablets by mouth Daily.     • meloxicam (MOBIC) 7.5 MG tablet Take 1 tablet by mouth Daily. 90 tablet 0   • ACETAMINOPHEN PO Take 500 mg by mouth As Needed.     • midodrine (PROAMATINE) 10 MG tablet Take 1 tablet by mouth 3 (Three) Times a Day Before Meals. 90 tablet 0     No current facility-administered medications for this visit.     Past Surgical History:   Procedure Laterality Date   • CHOLECYSTECTOMY     • COLONOSCOPY     • EYE SURGERY     • FRACTURE SURGERY     • HYSTERECTOMY     • JOINT REPLACEMENT        Social History     Tobacco Use   • Smoking status: Never     Passive exposure: Never   • Smokeless tobacco: Never   Vaping Use   • Vaping Use: Never used   Substance Use Topics   • Alcohol use: Never   • Drug use: Never     Family History   Problem Relation Age of Onset   • Diabetes Daughter    • Diabetes Son    • Cancer Son    • Diabetes Son    • Diabetes Son    • Diabetes Daughter    • Early death Daughter    • Early death Son      Health Maintenance Due   Topic Date Due   •  "ANNUAL WELLNESS VISIT  Never done      Immunization History   Administered Date(s) Administered   • COVID-19 (MODERNA) Monovalent Original Booster 10/11/2022   • COVID-19 (PFIZER) Purple Cap Monovalent 02/04/2021, 02/27/2021, 08/17/2021   • Fluad Quad 65+ 10/11/2022   • Fluzone High-Dose 65+yrs 10/14/2021   • Hep B, Unspecified 01/29/1996   • Hepatitis B Adult/Adolescent IM 07/02/1996   • Shingrix 11/04/2021        Objective     Vitals:    05/15/23 1022 05/15/23 1031   BP: 119/70 (!) 74/44   BP Location: Right arm Right arm   Patient Position: Sitting Standing   Cuff Size: Adult Adult   Pulse: 105 (!) 121   Temp: 97.9 °F (36.6 °C)    TempSrc: Oral    SpO2: 98%    Weight: 67.9 kg (149 lb 9.6 oz)    Height: 162.6 cm (64\")      Body mass index is 25.68 kg/m².     Physical Exam  Vitals reviewed.   Constitutional:       General: She is not in acute distress.     Appearance: Normal appearance. She is well-developed.   HENT:      Head: Normocephalic and atraumatic.   Cardiovascular:      Rate and Rhythm: Normal rate and regular rhythm.   Pulmonary:      Effort: Pulmonary effort is normal.      Breath sounds: Normal breath sounds.   Musculoskeletal:      Comments: Using walker   Neurological:      Mental Status: She is alert and oriented to person, place, and time.   Psychiatric:         Mood and Affect: Mood and affect normal.           Result Review :                               Assessment and Plan      Diagnoses and all orders for this visit:    1. Lumbar spondylosis (Primary)  -     meloxicam (MOBIC) 7.5 MG tablet; Take 1 tablet by mouth Daily.  Dispense: 90 tablet; Refill: 0    2. Orthostatic syncope  -     midodrine (PROAMATINE) 10 MG tablet; Take 1 tablet by mouth 3 (Three) Times a Day Before Meals.  Dispense: 90 tablet; Refill: 0    3. Restless leg syndrome    4. Acquired hypothyroidism  Comments:  Medical condition is stable.  Continue same therapy.  Will recheck at next regular appointment      Will restart her " on midodrine and meloxicam that she was started on during most recent hospitalization. She was supposed to continue from rehab but did not have prescription so has not been taking for the past 3 weeks. Discussed importance of keeping specialist appointments for orthostatic hypotension. May need to stop sinemet but will defer to neurology. She will continue physical therapy and use of walker. Change positions slowly.           Follow Up     Return in about 3 months (around 8/15/2023) for Medicare Wellness.

## 2023-05-25 ENCOUNTER — TELEPHONE (OUTPATIENT)
Dept: FAMILY MEDICINE CLINIC | Age: 88
End: 2023-05-25
Payer: MEDICARE

## 2023-05-25 NOTE — TELEPHONE ENCOUNTER
Call from [place] neil/ cherry pt has been having dizzy spells and more fatigued recently, she did orthostatics pt lying 122/70, sitting 120/70 & standing 90/52, was dizzy when she stood up        [phone]

## 2023-05-25 NOTE — TELEPHONE ENCOUNTER
This has improved from last orthostatics here in office. Is she taking both midodrine and florinef? Is she wearing compression stockings?

## 2023-05-26 NOTE — TELEPHONE ENCOUNTER
Recommend continuing same treatment and keep specialty appointments. Should be seen if worsening.

## 2023-06-01 RX ORDER — FLUDROCORTISONE ACETATE 0.1 MG/1
0.1 TABLET ORAL DAILY
Qty: 90 TABLET | Refills: 1 | Status: SHIPPED | OUTPATIENT
Start: 2023-06-01

## 2023-06-04 DIAGNOSIS — I95.1 ORTHOSTATIC SYNCOPE: ICD-10-CM

## 2023-06-05 RX ORDER — MIDODRINE HYDROCHLORIDE 10 MG/1
TABLET ORAL
Qty: 90 TABLET | Refills: 0 | Status: SHIPPED | OUTPATIENT
Start: 2023-06-05

## 2023-06-08 DIAGNOSIS — R42 DIZZINESS: ICD-10-CM

## 2023-06-08 RX ORDER — MECLIZINE HCL 12.5 MG/1
TABLET ORAL
Qty: 30 TABLET | Refills: 0 | Status: SHIPPED | OUTPATIENT
Start: 2023-06-08

## 2023-06-14 ENCOUNTER — OFFICE VISIT (OUTPATIENT)
Dept: NEUROLOGY | Facility: CLINIC | Age: 88
End: 2023-06-14
Payer: MEDICARE

## 2023-06-14 VITALS
SYSTOLIC BLOOD PRESSURE: 136 MMHG | DIASTOLIC BLOOD PRESSURE: 79 MMHG | BODY MASS INDEX: 25.1 KG/M2 | WEIGHT: 147 LBS | HEART RATE: 119 BPM | HEIGHT: 64 IN

## 2023-06-14 DIAGNOSIS — G20 PARKINSON'S SYNDROME: Primary | ICD-10-CM

## 2023-06-14 PROBLEM — G20.A1 PARKINSON'S SYNDROME: Status: ACTIVE | Noted: 2023-06-14

## 2023-06-14 PROCEDURE — 99203 OFFICE O/P NEW LOW 30 MIN: CPT | Performed by: PSYCHIATRY & NEUROLOGY

## 2023-06-14 PROCEDURE — 1159F MED LIST DOCD IN RCRD: CPT | Performed by: PSYCHIATRY & NEUROLOGY

## 2023-06-14 PROCEDURE — 1160F RVW MEDS BY RX/DR IN RCRD: CPT | Performed by: PSYCHIATRY & NEUROLOGY

## 2023-06-14 NOTE — ASSESSMENT & PLAN NOTE
I discussed with them that it is highly likely that she has Parkinson syndrome and the diagnosis of progressive supranuclear palsy comes into mind.  I discussed with them that I would like for her to get a DaTscan.  I also discussed with her and her daughter that her probability of falling is high even if she is on medications for Parkinson's disease or syndrome.  I discussed with them regarding need for her to be supervised at all times.  She has a lifeline alert at this time.

## 2023-06-14 NOTE — PROGRESS NOTES
"Chief Complaint  Dizziness    Subjective          Rae Smith is a 89 y.o. female who presents to [facility]  History of Present Illness  89-year-old woman evaluated for multiple falls.  She having falls September of last year.  She fractured her arm falling backwards November 2022.  2 months ago she fractured her right pelvic bone.  She usually falls backwards according to her daughter.  She lives by herself.  She is independent with activities of daily living.  She states that she has problems with walking.  She cannot  her feet.  She states when she falls she has no warning and she gets falls and she knows she is falling.  She is not slower in her activities of daily living.  Her daughter states that her facial expressions has been the same for a long time.  She has 7 living children.  2 children can help her around but the other children are still working.  She had a CT scan of the brain March of this year showing moderate atrophy with mild microvascular change.  There is no acute abnormality.  There is chronic sinusitis.    Her daughter tells me that she has orthostatic hypotension.    Objective   Vital Signs:   /79   Pulse 119   Ht 162.6 cm (64.02\")   Wt 66.7 kg (147 lb)   BMI 25.22 kg/m²     Physical Exam   She is alert, fluent, phasic, follows commands well.  EOMs are limited on upward gaze.  Downward gaze and lateral gaze is normal.  Facial strength is full.  Soft palate elevation and tongue are normal.  She has mild facial bradykinesia.  There is no tremor noted.  There is no weakness of the upper or lower extremities.  Fine finger movements are intact.  Reflexes are symmetrically decreased.  Station and gait she has difficulty getting up from a sitting to standing position.  She is afraid to fall and she is holding onto the walker.  She does not want to close her eyes.  She has retropulsion on gently pushing her backwards.  Heart is " tachycardic.        Assessment and Plan  Diagnoses and all orders for this visit:    1. Parkinson's syndrome (Primary)  Assessment & Plan:  I discussed with them that it is highly likely that she has Parkinson syndrome and the diagnosis of progressive supranuclear palsy comes into mind.  I discussed with them that I would like for her to get a DaTscan.  I also discussed with her and her daughter that her probability of falling is high even if she is on medications for Parkinson's disease or syndrome.  I discussed with them regarding need for her to be supervised at all times.  She has a lifeline alert at this time.           Total time spent with the patient and coordinating patient care was 35 minutes.    Follow Up  No follow-ups on file.  Patient was given instructions and counseling regarding her condition or for health maintenance advice. Please see specific information pulled into the AVS if appropriate.

## 2023-06-15 ENCOUNTER — PATIENT ROUNDING (BHMG ONLY) (OUTPATIENT)
Dept: NEUROLOGY | Facility: CLINIC | Age: 88
End: 2023-06-15
Payer: MEDICARE

## 2023-06-16 NOTE — TELEPHONE ENCOUNTER
Caller: ANA [facility]    Relationship: Home Health    Best call back number: [phone]    What form or medical record are you requesting: OFFICE VISIT          How would you like to receive the form or medical records (pick-up, mail, fax): FAX  If fax, what is the fax number: [phone]      Timeframe paperwork needed: ASAP    Additional notes:      ANA IS REQUESTING THE LAST OFFICE VISIT TO BE FAXED OVER TO HER.

## 2023-08-03 DIAGNOSIS — I95.1 ORTHOSTATIC SYNCOPE: ICD-10-CM

## 2023-08-03 DIAGNOSIS — M47.816 LUMBAR SPONDYLOSIS: ICD-10-CM

## 2023-08-04 RX ORDER — MIDODRINE HYDROCHLORIDE 10 MG/1
TABLET ORAL
Qty: 90 TABLET | Refills: 0 | Status: SHIPPED | OUTPATIENT
Start: 2023-08-04

## 2023-08-04 RX ORDER — MELOXICAM 7.5 MG/1
TABLET ORAL
Qty: 90 TABLET | Refills: 0 | Status: SHIPPED | OUTPATIENT
Start: 2023-08-04

## 2023-08-10 RX ORDER — FOLIC ACID 1 MG/1
TABLET ORAL
Qty: 60 TABLET | Refills: 0 | Status: SHIPPED | OUTPATIENT
Start: 2023-08-10

## 2023-08-13 DIAGNOSIS — E03.9 ACQUIRED HYPOTHYROIDISM: ICD-10-CM

## 2023-08-14 RX ORDER — LEVOTHYROXINE SODIUM 0.03 MG/1
TABLET ORAL
Qty: 45 TABLET | Refills: 1 | Status: SHIPPED | OUTPATIENT
Start: 2023-08-14

## 2023-08-22 ENCOUNTER — OFFICE VISIT (OUTPATIENT)
Dept: CARDIOLOGY | Facility: CLINIC | Age: 88
End: 2023-08-22
Payer: MEDICARE

## 2023-08-22 VITALS
BODY MASS INDEX: 26.12 KG/M2 | HEART RATE: 120 BPM | SYSTOLIC BLOOD PRESSURE: 115 MMHG | HEIGHT: 64 IN | DIASTOLIC BLOOD PRESSURE: 60 MMHG | WEIGHT: 153 LBS

## 2023-08-22 DIAGNOSIS — I95.1 ORTHOSTATIC SYNCOPE: Primary | ICD-10-CM

## 2023-08-22 PROCEDURE — 99213 OFFICE O/P EST LOW 20 MIN: CPT | Performed by: INTERNAL MEDICINE

## 2023-08-22 NOTE — PROGRESS NOTES
"Chief Complaint  Orthostatic syncope    Subjective        Rae Smith presents to [facility]  History of present illness:    Patient states she has had no falls since she saw us last.  She does note if she starts to feel little dizzy she will sit down.  She has not been taking the midodrine during the day but does take it in the morning and at night.  She is not drinking a whole lot of water.  She is using lots of salt.  She notes no chest pain, palpitations, or edema.      Past Medical History:   Diagnosis Date    Cataract     CTS (carpal tunnel syndrome)     GERD (gastroesophageal reflux disease)     Glaucoma     Low back pain     Osteopenia     Parkinson's syndrome 06/14/2023    Pneumonia     Urinary tract infection     Visual impairment          Past Surgical History:   Procedure Laterality Date    CHOLECYSTECTOMY      COLONOSCOPY      EYE SURGERY      FRACTURE SURGERY      HYSTERECTOMY      JOINT REPLACEMENT            Social History     Socioeconomic History    Marital status:    Tobacco Use    Smoking status: Never     Passive exposure: Never    Smokeless tobacco: Never   Vaping Use    Vaping Use: Never used   Substance and Sexual Activity    Alcohol use: Never    Drug use: Never    Sexual activity: Not Currently     Partners: Male     Birth control/protection: Rhythm, None         Family History   Problem Relation Age of Onset    Diabetes Daughter     Diabetes Son     Cancer Son     Diabetes Son     Diabetes Son     Diabetes Daughter     Early death Daughter     Early death Son     Dementia Sister     Stroke Sister           Allergies   Allergen Reactions    Codeine Nausea Only     insomnia  insomnia      Morphine Nausea And Vomiting     \"it makes me deathly sick\"  \"it makes me deathly sick\"              Current Outpatient Medications:     ACETAMINOPHEN PO, Take 500 mg by mouth As Needed., Disp: , Rfl:     Biotin 5000 MCG tablet, Take  by mouth Daily., Disp: , Rfl: " "    carbidopa-levodopa (SINEMET)  MG per tablet, TAKE 1 TABLET BY MOUTH THREE TIMES DAILY, Disp: 270 tablet, Rfl: 0    Cholecalciferol (VITAMIN D3 PO), Take 50 mcg by mouth Daily., Disp: , Rfl:     Cranberry 250 MG capsule, Take 250 mg by mouth Daily., Disp: , Rfl:     cyclobenzaprine (FLEXERIL) 10 MG tablet, Take 0.5-1 tablets by mouth Every Night., Disp: , Rfl:     Diclofenac Sodium (VOLTAREN) 1 % gel gel, As Needed., Disp: , Rfl:     dorzolamide (TRUSOPT) 2 % ophthalmic solution, dorzolamide 2 % eye drops  INSTILL 1 DROP EACH EYE EVERY 12 HOURS, Disp: , Rfl:     DULoxetine (CYMBALTA) 20 MG capsule, Take 1 capsule by mouth Daily., Disp: , Rfl:     fludrocortisone 0.1 MG tablet, Take 2 tablets by mouth Daily., Disp: 90 tablet, Rfl: 1    folic acid (FOLVITE) 1 MG tablet, TAKE 1 TABLET BY MOUTH TWICE DAILY, Disp: 60 tablet, Rfl: 0    gabapentin (NEURONTIN) 300 MG capsule, TAKE 1 CAPSULE BY MOUTH THREE TIMES DAILY, Disp: 270 capsule, Rfl: 0    latanoprost (XALATAN) 0.005 % ophthalmic solution, 2 (Two) Times a Day., Disp: , Rfl:     levothyroxine (SYNTHROID, LEVOTHROID) 25 MCG tablet, TAKE 1/2 TABLET BY MOUTH ONCE DAILY, Disp: 45 tablet, Rfl: 1    lidocaine (LIDODERM) 5 %, Place 1 patch on the skin as directed by provider Daily., Disp: , Rfl:     meclizine (ANTIVERT) 12.5 MG tablet, TAKE 1 TABLET BY MOUTH TWICE DAILY AS NEEDED FOR dizziness, Disp: 60 tablet, Rfl: 0    melatonin 3 MG tablet, Take 4 tablets by mouth Daily., Disp: , Rfl:     meloxicam (MOBIC) 7.5 MG tablet, TAKE 1 TABLET BY MOUTH ONCE DAILY, Disp: 90 tablet, Rfl: 0    midodrine (PROAMATINE) 10 MG tablet, TAKE 1 TABLET BY MOUTH THREE TIMES DAILY BEFORE MEALS, Disp: 90 tablet, Rfl: 0      ROS:  Cardiac review of systems positive for mild lightheadedness    Objective     /60   Pulse 120   Ht 162.6 cm (64\")   Wt 69.4 kg (153 lb)   BMI 26.26 kg/mý       General Appearance:   well developed  well nourished  HENT:   oropharynx moist  lips not " cyanotic  Respiratory:  no respiratory distress  normal breath sounds  no rales  Cardiovascular:  no jugular venous distention  regular rhythm  S1 normal, S2 normal  no S3, no S4   no murmur  no rub, no thrill  No carotid bruit  pedal pulses normal  lower extremity edema: none    Musculoskeletal:  no clubbing of fingers.   normocephalic, head atraumatic  Skin:   warm, dry  Psychiatric:  judgement and insight appropriate  normal mood and affect    ECHO:    STRESS:    CATH:  No results found for this or any previous visit.    BMP:     Glucose   Date Value Ref Range Status   07/06/2023 106 (H) 65 - 99 mg/dL Final     BUN   Date Value Ref Range Status   07/06/2023 14 8 - 23 mg/dL Final     Creatinine   Date Value Ref Range Status   07/06/2023 0.88 0.57 - 1.00 mg/dL Final     Sodium   Date Value Ref Range Status   07/06/2023 140 136 - 145 mmol/L Final     Potassium   Date Value Ref Range Status   07/06/2023 4.7 3.5 - 5.2 mmol/L Final     Chloride   Date Value Ref Range Status   07/06/2023 101 98 - 107 mmol/L Final     CO2   Date Value Ref Range Status   07/06/2023 31.0 (H) 22.0 - 29.0 mmol/L Final     Calcium   Date Value Ref Range Status   07/06/2023 9.9 (H) 8.2 - 9.6 mg/dL Final     BUN/Creatinine Ratio   Date Value Ref Range Status   07/06/2023 15.9 7.0 - 25.0 Final     Anion Gap   Date Value Ref Range Status   07/06/2023 8.0 5.0 - 15.0 mmol/L Final     eGFR   Date Value Ref Range Status   07/06/2023 62.1 >60.0 mL/min/1.73 Final     LIPIDS:  No results found for: CHOL, TRIG, HDL, LDL, VLDL, LDLHDL      Procedures             ASSESSMENT:  Diagnoses and all orders for this visit:    1. Orthostatic syncope (Primary)         PLAN:    1.  Patient has had no falls since we saw her last.  She still notes some dizziness but is able to get to a chair and sit down.  We checked orthostatic blood pressure measurements and her blood pressure still dropped to 76/38 with standing.  2.  Patient is on Florinef 0.2 daily and midodrine  10 mg 3 times daily although she is only really taking it twice a day.  I asked her to try to get back to taking it 3 times a day.  I asked her to make sure she is drinking plenty of water and told her I am fine with her using lots of salt.  3.  I think overall as long as she is not getting dizzy to the point where she is falling that we can keep things where they are.  If her symptoms got worse we would either have to consider going up on the midodrine or Florinef at that time.  She has an echo from back in 2019 that showed normal ejection fraction and no significant valvular disease.  We will just continue to monitor for symptoms closely.      Return in about 6 months (around 2/22/2024).     Patient was given instructions and counseling regarding her condition or for health maintenance advice. Please see specific information pulled into the AVS if appropriate.         Justice Gomez MD   8/22/2023  12:50 EDT

## 2023-08-28 ENCOUNTER — OFFICE VISIT (OUTPATIENT)
Dept: FAMILY MEDICINE CLINIC | Age: 88
End: 2023-08-28
Payer: MEDICARE

## 2023-08-28 VITALS
HEIGHT: 64 IN | DIASTOLIC BLOOD PRESSURE: 58 MMHG | OXYGEN SATURATION: 96 % | HEART RATE: 104 BPM | BODY MASS INDEX: 26.29 KG/M2 | WEIGHT: 154 LBS | SYSTOLIC BLOOD PRESSURE: 128 MMHG

## 2023-08-28 DIAGNOSIS — R35.0 FREQUENCY OF URINATION: ICD-10-CM

## 2023-08-28 DIAGNOSIS — Z79.899 HIGH RISK MEDICATION USE: ICD-10-CM

## 2023-08-28 DIAGNOSIS — Z23 ENCOUNTER FOR IMMUNIZATION: ICD-10-CM

## 2023-08-28 DIAGNOSIS — Z00.00 MEDICARE ANNUAL WELLNESS VISIT, SUBSEQUENT: Primary | ICD-10-CM

## 2023-08-28 PROBLEM — S42.293P: Status: RESOLVED | Noted: 2023-01-04 | Resolved: 2023-08-28

## 2023-08-28 LAB
AMPHET+METHAMPHET UR QL: NEGATIVE
AMPHETAMINES UR QL: NEGATIVE
BARBITURATES UR QL SCN: NEGATIVE
BENZODIAZ UR QL SCN: NEGATIVE
BILIRUB BLD-MCNC: NEGATIVE MG/DL
BUPRENORPHINE SERPL-MCNC: NEGATIVE NG/ML
CANNABINOIDS SERPL QL: NEGATIVE
CLARITY, POC: ABNORMAL
COCAINE UR QL: NEGATIVE
COLOR UR: YELLOW
EXPIRATION DATE: ABNORMAL
EXPIRATION DATE: NORMAL
GLUCOSE UR STRIP-MCNC: NEGATIVE MG/DL
KETONES UR QL: NEGATIVE
LEUKOCYTE EST, POC: ABNORMAL
Lab: ABNORMAL
Lab: NORMAL
MDMA UR QL SCN: NEGATIVE
METHADONE UR QL SCN: NEGATIVE
NITRITE UR-MCNC: NEGATIVE MG/ML
OPIATES UR QL: NEGATIVE
OXYCODONE UR QL SCN: NEGATIVE
PCP UR QL SCN: NEGATIVE
PH UR: 6.5 [PH] (ref 5–8)
PROT UR STRIP-MCNC: ABNORMAL MG/DL
RBC # UR STRIP: NEGATIVE /UL
SP GR UR: 1.02 (ref 1–1.03)
UROBILINOGEN UR QL: NORMAL

## 2023-08-28 PROCEDURE — 87088 URINE BACTERIA CULTURE: CPT | Performed by: NURSE PRACTITIONER

## 2023-08-28 PROCEDURE — 87086 URINE CULTURE/COLONY COUNT: CPT | Performed by: NURSE PRACTITIONER

## 2023-08-28 PROCEDURE — 87186 SC STD MICRODIL/AGAR DIL: CPT | Performed by: NURSE PRACTITIONER

## 2023-08-28 NOTE — PROGRESS NOTES
The ABCs of the Annual Wellness Visit  Subsequent Medicare Wellness Visit    Subjective    Rae Smith is a 89 y.o. female who presents for a Subsequent Medicare Wellness Visit.    The following portions of the patient's history were reviewed and   updated as appropriate: allergies, current medications, past family history, past medical history, past social history, past surgical history, and problem list.    Compared to one year ago, the patient feels her physical   health is better.    Compared to one year ago, the patient feels her mental   health is the same.    Recent Hospitalizations:  She was admitted within the past 365 days at [facility].       Current Medical Providers:  Patient Care Team:  Liliya Marcum APRN as PCP - General (Nurse Practitioner)  Justice Gomez MD as Consulting Physician (Cardiology)    Outpatient Medications Prior to Visit   Medication Sig Dispense Refill    ACETAMINOPHEN PO Take 500 mg by mouth As Needed.      carbidopa-levodopa (SINEMET)  MG per tablet TAKE 1 TABLET BY MOUTH THREE TIMES DAILY 270 tablet 0    Cholecalciferol (VITAMIN D3 PO) Take 50 mcg by mouth Daily.      cyclobenzaprine (FLEXERIL) 10 MG tablet Take 0.5-1 tablets by mouth Every Night.      Diclofenac Sodium (VOLTAREN) 1 % gel gel As Needed.      dorzolamide (TRUSOPT) 2 % ophthalmic solution dorzolamide 2 % eye drops   INSTILL 1 DROP EACH EYE EVERY 12 HOURS      DULoxetine (CYMBALTA) 20 MG capsule Take 1 capsule by mouth Daily.      fludrocortisone 0.1 MG tablet Take 2 tablets by mouth Daily. 90 tablet 1    folic acid (FOLVITE) 1 MG tablet TAKE 1 TABLET BY MOUTH TWICE DAILY 60 tablet 0    gabapentin (NEURONTIN) 300 MG capsule TAKE 1 CAPSULE BY MOUTH THREE TIMES DAILY 270 capsule 0    latanoprost (XALATAN) 0.005 % ophthalmic solution 2 (Two) Times a Day.      levothyroxine (SYNTHROID, LEVOTHROID) 25 MCG tablet TAKE 1/2 TABLET BY MOUTH ONCE DAILY 45 tablet 1    meclizine (ANTIVERT) 12.5 MG  "tablet TAKE 1 TABLET BY MOUTH TWICE DAILY AS NEEDED FOR dizziness 60 tablet 0    melatonin 3 MG tablet Take 4 tablets by mouth Daily.      meloxicam (MOBIC) 7.5 MG tablet TAKE 1 TABLET BY MOUTH ONCE DAILY 90 tablet 0    midodrine (PROAMATINE) 10 MG tablet TAKE 1 TABLET BY MOUTH THREE TIMES DAILY BEFORE MEALS 90 tablet 0    Biotin 5000 MCG tablet Take  by mouth Daily.      Cranberry 250 MG capsule Take 250 mg by mouth Daily.      lidocaine (LIDODERM) 5 % Place 1 patch on the skin as directed by provider Daily. (Patient not taking: Reported on 8/28/2023)       No facility-administered medications prior to visit.       No opioid medication identified on active medication list. I have reviewed chart for other potential  high risk medication/s and harmful drug interactions in the elderly.        Aspirin is not on active medication list.  Aspirin use is not indicated based on review of current medical condition/s. Risk of harm outweighs potential benefits.  .    Patient Active Problem List   Diagnosis    GERD (gastroesophageal reflux disease)    Inflammation of sacroiliac joint    Lumbar spondylosis    Acquired hypothyroidism    Restless leg syndrome    Orthostatic syncope    Multiple closed pelvic fractures without disruption of pelvic Ruby    History of melanoma    High risk medication use     Advance Care Planning   Advance Care Planning     Advance Directive is on file.  ACP discussion was held with the patient during this visit. Patient has an advance directive in EMR which is still valid.      Objective    Vitals:    08/28/23 1103   BP: 128/58   BP Location: Left arm   Patient Position: Sitting   Cuff Size: Adult   Pulse: 104   SpO2: 96%   Weight: 69.9 kg (154 lb)   Height: 162.6 cm (64.02\")     Estimated body mass index is 26.42 kg/mý as calculated from the following:    Height as of this encounter: 162.6 cm (64.02\").    Weight as of this encounter: 69.9 kg (154 lb).    BMI is >= 25 and <30. (Overweight) The " following options were offered after discussion;: nutrition counseling/recommendations      Does the patient have evidence of cognitive impairment? Yes          HEALTH RISK ASSESSMENT    Smoking Status:  Social History     Tobacco Use   Smoking Status Never    Passive exposure: Never   Smokeless Tobacco Never     Alcohol Consumption:  Social History     Substance and Sexual Activity   Alcohol Use Never     Fall Risk Screen:    KATIE Fall Risk Assessment was completed, and patient is at HIGH risk for falls. Assessment completed on:2023    Depression Screenin/28/2023    11:07 AM   PHQ-2/PHQ-9 Depression Screening   Little Interest or Pleasure in Doing Things 0-->not at all   Feeling Down, Depressed or Hopeless 0-->not at all   PHQ-9: Brief Depression Severity Measure Score 0       Health Habits and Functional and Cognitive Screenin/28/2023    11:07 AM   Functional & Cognitive Status   Do you have difficulty preparing food and eating? No   Do you have difficulty bathing yourself, getting dressed or grooming yourself? No   Do you have difficulty using the toilet? No   Do you have difficulty moving around from place to place? No   Do you have trouble with steps or getting out of a bed or a chair? No   Current Diet Well Balanced Diet   Dental Exam Up to date   Eye Exam Up to date   Exercise (times per week) 0 times per week   Current Exercises Include No Regular Exercise   Do you need help using the phone?  No   Are you deaf or do you have serious difficulty hearing?  Yes   Do you need help to go to places out of walking distance? No   Do you need help shopping? Yes   Do you need help preparing meals?  No   Do you need help with housework?  No   Do you need help with laundry? No   Do you need help taking your medications? No   Do you need help managing money? No   Do you ever drive or ride in a car without wearing a seat belt? No   Have you felt unusual stress, anger or loneliness in the last  month? No   Who do you live with? Alone   If you need help, do you have trouble finding someone available to you? No   Do you have difficulty concentrating, remembering or making decisions? No       Age-appropriate Screening Schedule:  Refer to the list below for future screening recommendations based on patient's age, sex and/or medical conditions. Orders for these recommended tests are listed in the plan section. The patient has been provided with a written plan.    Health Maintenance   Topic Date Due    DXA SCAN  Never done    ZOSTER VACCINE (2 of 2) 12/30/2021    ANNUAL WELLNESS VISIT  Never done    COVID-19 Vaccine (5 - Pfizer series) 08/30/2023 (Originally 12/6/2022)    TDAP/TD VACCINES (1 - Tdap) 02/13/2024 (Originally 3/6/1951)    INFLUENZA VACCINE  10/01/2023    BMI FOLLOWUP  02/22/2024    Pneumococcal Vaccine 65+  Completed                  CMS Preventative Services Quick Reference  Risk Factors Identified During Encounter  Fall Risk-High or Moderate: Discussed Fall Prevention in the home  Immunizations Discussed/Encouraged: Tdap, Shingrix, and COVID19  The above risks/problems have been discussed with the patient.  Pertinent information has been shared with the patient in the After Visit Summary.  An After Visit Summary and PPPS were made available to the patient.    Follow Up:   Next Medicare Wellness visit to be scheduled in 1 year.       Additional E&M Note during same encounter follows:  Patient has multiple medical problems which are significant and separately identifiable that require additional work above and beyond the Medicare Wellness Visit.      Chief Complaint  Medicare Wellness-subsequent    Subjective        HPI  Rae Smith  takes levothyroxine 12.5 mcg daily for hypothyroidism. Last TSH normal.   She is on sinemet and gabapentin for RLS. Has been taking these medications for 'several years'. Stable on current med.   She was hospitalized earlier this year for orthostatic syncope and  "hip fracture. Has been seen by neurology and cardiology. Started on midodrine and florinef for orthostatic hypotension. She has learned to sit down if she needs to. Notes improvement in dizziness symptoms. Denies syncope.     Review of Systems   Constitutional:  Negative for chills and fever.   HENT:  Negative for ear pain and sore throat.    Eyes:  Negative for photophobia.   Respiratory:  Negative for cough and shortness of breath.    Cardiovascular:  Negative for chest pain and palpitations.   Gastrointestinal:  Negative for abdominal pain and rectal pain.   Genitourinary:  Positive for frequency.   Skin:  Negative for rash.   Neurological:  Positive for dizziness (improved). Negative for seizures.   Psychiatric/Behavioral:  Negative for hallucinations and suicidal ideas.        Objective   Vital Signs:  /58 (BP Location: Left arm, Patient Position: Sitting, Cuff Size: Adult)   Pulse 104   Ht 162.6 cm (64.02\")   Wt 69.9 kg (154 lb)   SpO2 96%   BMI 26.42 kg/mý     Physical Exam  Vitals reviewed.   Constitutional:       General: She is not in acute distress.     Appearance: Normal appearance. She is well-developed.   HENT:      Head: Normocephalic and atraumatic.   Cardiovascular:      Rate and Rhythm: Normal rate and regular rhythm.   Pulmonary:      Effort: Pulmonary effort is normal.      Breath sounds: Normal breath sounds.   Neurological:      Mental Status: She is alert and oriented to person, place, and time.   Psychiatric:         Mood and Affect: Mood and affect normal.                       Assessment and Plan   Diagnoses and all orders for this visit:    1. Medicare annual wellness visit, subsequent (Primary)  Comments:  Appropriate screenings and vaccinations were reviewed with the pt and offered as indicated.  Pt counseled on healthy lifestyle including healthy diet, exercise.    2. Immunization  Comments:  Updating prevnar 20 vaccine today.  Orders:  -     Pneumococcal Conjugate Vaccine " 20-Valent (PCV20)    3. Frequency of urination  -     POCT urinalysis dipstick, automated  -     Urine Culture - Urine, Urine, Clean Catch; Future  -     Urine Culture - Urine, Urine, Clean Catch    4. High risk medication use  -     POC Urine Drug Screen [facility]      Controlled substance documentation: VERONICA reviewed; drug screen performed and appropriate; consent is reviewed and signed and on the chart.  Is aware of risk of addiction on this medication, understands will need to follow up for a review at least every 3 months and medications will be adjusted or decreased as deemed appropriate at each visit.  No history of drug or alcohol abuse.  No concerns about diversion or abuse. Denies side effects related to the medication.  Aware may be called in for pill counts.  The dosing of this medication will be reviewed on a regular basis and reduced if possible.              Follow Up   Return in about 6 months (around 2/28/2024) for Recheck.  Patient was given instructions and counseling regarding her condition or for health maintenance advice. Please see specific information pulled into the AVS if appropriate.

## 2023-08-30 DIAGNOSIS — N39.0 URINARY TRACT INFECTION WITHOUT HEMATURIA, SITE UNSPECIFIED: Primary | ICD-10-CM

## 2023-08-30 LAB — BACTERIA SPEC AEROBE CULT: ABNORMAL

## 2023-08-30 RX ORDER — CEFDINIR 300 MG/1
300 CAPSULE ORAL 2 TIMES DAILY
Qty: 20 CAPSULE | Refills: 0 | Status: SHIPPED | OUTPATIENT
Start: 2023-08-30 | End: 2023-09-09

## 2023-10-05 RX ORDER — FLUDROCORTISONE ACETATE 0.1 MG/1
TABLET ORAL
Qty: 180 TABLET | Refills: 3 | Status: SHIPPED | OUTPATIENT
Start: 2023-10-05

## 2023-10-30 ENCOUNTER — TELEPHONE (OUTPATIENT)
Dept: FAMILY MEDICINE CLINIC | Age: 88
End: 2023-10-30
Payer: MEDICARE

## 2023-10-30 DIAGNOSIS — T81.49XA SURGICAL WOUND INFECTION: Primary | ICD-10-CM

## 2023-10-30 DIAGNOSIS — Z51.5 END OF LIFE CARE: ICD-10-CM

## 2023-10-30 RX ORDER — MORPHINE SULFATE 100 MG/5ML
5 SOLUTION ORAL EVERY 4 HOURS PRN
Qty: 30 ML | Refills: 0 | Status: SHIPPED | OUTPATIENT
Start: 2023-10-30

## 2023-10-30 RX ORDER — ONDANSETRON 4 MG/1
4 TABLET, ORALLY DISINTEGRATING ORAL EVERY 4 HOURS PRN
Qty: 12 TABLET | Refills: 0 | Status: SHIPPED | OUTPATIENT
Start: 2023-10-30

## 2023-10-30 RX ORDER — ATROPINE SULFATE 10 MG/ML
SOLUTION/ DROPS OPHTHALMIC
Qty: 2 ML | Refills: 0 | Status: SHIPPED | OUTPATIENT
Start: 2023-10-30 | End: 2023-10-31 | Stop reason: SDUPTHER

## 2023-10-30 NOTE — TELEPHONE ENCOUNTER
Coming home from hospital with hospice.  Recent hip replacement surgery with postoperative wound infection and does not desire reoperation.    mas

## 2023-10-31 ENCOUNTER — READMISSION MANAGEMENT (OUTPATIENT)
Dept: CALL CENTER | Facility: HOSPITAL | Age: 88
End: 2023-10-31
Payer: MEDICARE

## 2023-10-31 DIAGNOSIS — T81.49XA SURGICAL WOUND INFECTION: ICD-10-CM

## 2023-10-31 DIAGNOSIS — Z51.5 END OF LIFE CARE: ICD-10-CM

## 2023-10-31 RX ORDER — ATROPINE SULFATE 10 MG/ML
SOLUTION/ DROPS OPHTHALMIC
Qty: 2 ML | Refills: 0 | Status: SHIPPED | OUTPATIENT
Start: 2023-10-31

## 2023-10-31 RX ORDER — LORAZEPAM 2 MG/ML
1 CONCENTRATE ORAL EVERY 4 HOURS PRN
Qty: 30 ML | Refills: 0 | Status: SHIPPED | OUTPATIENT
Start: 2023-10-31

## 2023-10-31 NOTE — OUTREACH NOTE
Prep Survey      Flowsheet Row Responses   [facility] facility patient discharged from? Non-[facility]   Is LACE score < 7 ? Non-BH Discharge   Eligibility [facility]   Date of Admission 10/28/23   Date of Discharge 10/30/23   Discharge Disposition Home or Self Care   Discharge diagnosis Superficial incisional surgical site infection   Does the patient have one of the following disease processes/diagnoses(primary or secondary)? Other   Prep survey completed? Yes            Jane Meyer Registered Nurse

## 2023-11-01 ENCOUNTER — TELEPHONE (OUTPATIENT)
Dept: FAMILY MEDICINE CLINIC | Age: 88
End: 2023-11-01
Payer: MEDICARE

## 2023-11-01 ENCOUNTER — TRANSITIONAL CARE MANAGEMENT TELEPHONE ENCOUNTER (OUTPATIENT)
Dept: CALL CENTER | Facility: HOSPITAL | Age: 88
End: 2023-11-01
Payer: MEDICARE

## 2023-11-01 NOTE — TELEPHONE ENCOUNTER
Spoke to Brittany. Reporting not comfortable 3 hours after last 0.25 ml dose of morphine.    Will increase to 0.5ml Q4 hr schedule and 0.25 ml Q 1 prn    Can change opioid if not tolerating due to nausea

## 2023-11-01 NOTE — OUTREACH NOTE
Call Center TCM Note      Flowsheet Row Responses   [facility] patient discharged from? Non-  [[place]]   Does the patient have one of the following disease processes/diagnoses(primary or secondary)? Other   TCM attempt successful? Yes  [contacts with [facility]]   Call start time [phone]   Call end time [phone]   Discharge diagnosis Superficial incisional surgical site infection   Person spoke with today (if not patient) and relationship Daughter   Comments Pt has HOSPICE.   Does the patient have an appointment with their PCP within 7-14 days of discharge? No   Nursing Interventions Patient declined scheduling/rescheduling appointment at this time   TCM call completed? Yes   Wrap up additional comments Daughter reports Pt has HOSPICE. Decline appt.   Call end time [phone]            Enriqueta Shirley RN    11/1/2023, 08:41 EDT

## 2023-11-03 DIAGNOSIS — Z51.5 END OF LIFE CARE: ICD-10-CM

## 2023-11-03 DIAGNOSIS — T81.49XA SURGICAL WOUND INFECTION: Primary | ICD-10-CM

## 2023-11-03 RX ORDER — MORPHINE SULFATE 100 MG/5ML
SOLUTION ORAL
Qty: 30 ML | Refills: 0 | Status: SHIPPED | OUTPATIENT
Start: 2023-11-03

## 2023-11-05 DIAGNOSIS — T81.49XA SURGICAL WOUND INFECTION: Primary | ICD-10-CM

## 2023-11-05 DIAGNOSIS — Z51.5 END OF LIFE CARE: ICD-10-CM

## 2023-11-05 RX ORDER — LORAZEPAM 2 MG/ML
1 CONCENTRATE ORAL
Qty: 30 ML | Refills: 0 | Status: SHIPPED | OUTPATIENT
Start: 2023-11-05

## 2023-11-05 RX ORDER — MORPHINE SULFATE 100 MG/5ML
SOLUTION ORAL
Qty: 30 ML | Refills: 0 | Status: SHIPPED | OUTPATIENT
Start: 2023-11-05